# Patient Record
Sex: FEMALE | Race: WHITE | Employment: FULL TIME | URBAN - METROPOLITAN AREA
[De-identification: names, ages, dates, MRNs, and addresses within clinical notes are randomized per-mention and may not be internally consistent; named-entity substitution may affect disease eponyms.]

---

## 2017-03-27 ENCOUNTER — EMPLOYEE WELLNESS (OUTPATIENT)
Dept: OTHER | Age: 32
End: 2017-03-27

## 2017-05-16 ENCOUNTER — OFFICE VISIT (OUTPATIENT)
Dept: FAMILY MEDICINE CLINIC | Age: 32
End: 2017-05-16

## 2017-05-16 VITALS
WEIGHT: 239 LBS | BODY MASS INDEX: 38.41 KG/M2 | HEART RATE: 90 BPM | OXYGEN SATURATION: 98 % | DIASTOLIC BLOOD PRESSURE: 70 MMHG | HEIGHT: 66 IN | SYSTOLIC BLOOD PRESSURE: 114 MMHG

## 2017-05-16 DIAGNOSIS — Z3A.01 LESS THAN 8 WEEKS GESTATION OF PREGNANCY: ICD-10-CM

## 2017-05-16 DIAGNOSIS — R11.0 NAUSEA: Primary | ICD-10-CM

## 2017-05-16 DIAGNOSIS — G43.809 OTHER MIGRAINE WITHOUT STATUS MIGRAINOSUS, NOT INTRACTABLE: ICD-10-CM

## 2017-05-16 PROCEDURE — 99213 OFFICE O/P EST LOW 20 MIN: CPT | Performed by: FAMILY MEDICINE

## 2017-05-16 RX ORDER — PROMETHAZINE HYDROCHLORIDE 25 MG/1
25 TABLET ORAL EVERY 6 HOURS PRN
Qty: 30 TABLET | Refills: 0 | Status: SHIPPED | OUTPATIENT
Start: 2017-05-16 | End: 2017-07-27 | Stop reason: SDUPTHER

## 2017-05-16 ASSESSMENT — PATIENT HEALTH QUESTIONNAIRE - PHQ9
SUM OF ALL RESPONSES TO PHQ QUESTIONS 1-9: 0
1. LITTLE INTEREST OR PLEASURE IN DOING THINGS: 0
2. FEELING DOWN, DEPRESSED OR HOPELESS: 0
SUM OF ALL RESPONSES TO PHQ9 QUESTIONS 1 & 2: 0

## 2017-07-27 DIAGNOSIS — R11.0 NAUSEA: ICD-10-CM

## 2017-07-27 RX ORDER — PROMETHAZINE HYDROCHLORIDE 25 MG/1
25 TABLET ORAL EVERY 6 HOURS PRN
Qty: 30 TABLET | Refills: 0 | Status: SHIPPED | OUTPATIENT
Start: 2017-07-27

## 2017-07-31 RX ORDER — NORGESTIMATE AND ETHINYL ESTRADIOL 0.25-0.035
1 KIT ORAL DAILY
Qty: 1 PACKET | Refills: 3 | Status: SHIPPED | OUTPATIENT
Start: 2017-07-31

## 2018-03-20 VITALS — WEIGHT: 241 LBS | BODY MASS INDEX: 39.49 KG/M2

## 2023-09-08 ENCOUNTER — OFFICE VISIT (OUTPATIENT)
Dept: BARIATRICS/WEIGHT MGMT | Age: 38
End: 2023-09-08

## 2023-09-08 VITALS
HEIGHT: 67 IN | SYSTOLIC BLOOD PRESSURE: 120 MMHG | TEMPERATURE: 97.5 F | HEART RATE: 64 BPM | WEIGHT: 232.6 LBS | BODY MASS INDEX: 36.51 KG/M2 | DIASTOLIC BLOOD PRESSURE: 78 MMHG

## 2023-09-08 VITALS — BODY MASS INDEX: 36.51 KG/M2 | HEIGHT: 67 IN | WEIGHT: 232.6 LBS

## 2023-09-08 DIAGNOSIS — I10 ESSENTIAL HYPERTENSION: ICD-10-CM

## 2023-09-08 DIAGNOSIS — E66.01 MORBID OBESITY WITH BMI OF 40.0-44.9, ADULT (HCC): ICD-10-CM

## 2023-09-08 DIAGNOSIS — F41.9 ANXIETY: Primary | ICD-10-CM

## 2023-09-08 DIAGNOSIS — G43.909 MIGRAINE WITHOUT STATUS MIGRAINOSUS, NOT INTRACTABLE, UNSPECIFIED MIGRAINE TYPE: ICD-10-CM

## 2023-09-08 RX ORDER — METOPROLOL SUCCINATE 25 MG/1
25 TABLET, EXTENDED RELEASE ORAL DAILY
COMMUNITY
Start: 2023-08-20

## 2023-09-08 RX ORDER — PHENTERMINE HYDROCHLORIDE 37.5 MG/1
37.5 CAPSULE ORAL EVERY MORNING
Qty: 30 CAPSULE | Refills: 0 | Status: SHIPPED | OUTPATIENT
Start: 2023-09-08 | End: 2023-10-08

## 2023-09-08 RX ORDER — BUPROPION HYDROCHLORIDE 150 MG/1
150 TABLET ORAL DAILY
COMMUNITY
Start: 2023-08-19

## 2023-09-08 RX ORDER — ELETRIPTAN HYDROBROMIDE 40 MG/1
40 TABLET, FILM COATED ORAL
COMMUNITY

## 2023-09-08 RX ORDER — FREMANEZUMAB-VFRM 225 MG/1.5ML
INJECTION SUBCUTANEOUS
COMMUNITY
Start: 2023-08-03

## 2023-09-08 RX ORDER — TOPIRAMATE 25 MG/1
25 TABLET ORAL DAILY
COMMUNITY
Start: 2023-08-20

## 2023-09-08 RX ORDER — ALPRAZOLAM 0.5 MG/1
TABLET ORAL
COMMUNITY
Start: 2023-08-19

## 2023-09-08 RX ORDER — NORETHINDRONE ACETATE AND ETHINYL ESTRADIOL 1; .02 MG/1; MG/1
1 TABLET ORAL DAILY
COMMUNITY
Start: 2023-08-14

## 2023-09-08 NOTE — PATIENT INSTRUCTIONS
Goals:  Nutrition Goal:   Consider trying medium sized coffee instead of large iced coffee  Water Goal:  Continue at least four bottles of water a day or greater  Exercise Goal:  Take dog for a walk three days a week for 30 minutes on days not working.   I will use food journal to record meal times, serving sizes to keep self mindful and self awareness

## 2023-09-10 ASSESSMENT — ENCOUNTER SYMPTOMS
CONSTIPATION: 0
WHEEZING: 0
ABDOMINAL DISTENTION: 0
BLOOD IN STOOL: 0
EYE PAIN: 0
EYE DISCHARGE: 0
EYE ITCHING: 0
SHORTNESS OF BREATH: 0
COUGH: 0
EYE REDNESS: 0
VOMITING: 0
STRIDOR: 0
ANAL BLEEDING: 0
RHINORRHEA: 0
VOICE CHANGE: 0
CHOKING: 0
SINUS PRESSURE: 0
APNEA: 0
CHEST TIGHTNESS: 0
TROUBLE SWALLOWING: 0
NAUSEA: 0
SORE THROAT: 0
FACIAL SWELLING: 0
ALLERGIC/IMMUNOLOGIC NEGATIVE: 1
RECTAL PAIN: 0
PHOTOPHOBIA: 0
DIARRHEA: 0
COLOR CHANGE: 0
BACK PAIN: 0
ABDOMINAL PAIN: 0

## 2023-10-06 ENCOUNTER — OFFICE VISIT (OUTPATIENT)
Dept: BARIATRICS/WEIGHT MGMT | Age: 38
End: 2023-10-06

## 2023-10-06 VITALS
DIASTOLIC BLOOD PRESSURE: 78 MMHG | HEART RATE: 76 BPM | HEIGHT: 67 IN | SYSTOLIC BLOOD PRESSURE: 118 MMHG | BODY MASS INDEX: 35.28 KG/M2 | WEIGHT: 224.8 LBS | TEMPERATURE: 97.7 F

## 2023-10-06 RX ORDER — PHENTERMINE HYDROCHLORIDE 37.5 MG/1
37.5 CAPSULE ORAL EVERY MORNING
Qty: 30 CAPSULE | Refills: 0 | Status: SHIPPED | OUTPATIENT
Start: 2023-10-06 | End: 2023-11-05

## 2023-10-06 RX ORDER — SUMATRIPTAN 50 MG/1
TABLET, FILM COATED ORAL
COMMUNITY
Start: 2023-10-03

## 2023-10-06 ASSESSMENT — ENCOUNTER SYMPTOMS
EYE REDNESS: 0
SHORTNESS OF BREATH: 0
SINUS PRESSURE: 0
EYE ITCHING: 0
SORE THROAT: 0
COLOR CHANGE: 0
CHEST TIGHTNESS: 0
NAUSEA: 0
VOICE CHANGE: 0
STRIDOR: 0
EYE DISCHARGE: 0
WHEEZING: 0
CHOKING: 0
VOMITING: 0
BACK PAIN: 0
FACIAL SWELLING: 0
RHINORRHEA: 0
ANAL BLEEDING: 0
ABDOMINAL PAIN: 0
DIARRHEA: 0
APNEA: 0
TROUBLE SWALLOWING: 0
CONSTIPATION: 0
ALLERGIC/IMMUNOLOGIC NEGATIVE: 1
COUGH: 0
ABDOMINAL DISTENTION: 0
BLOOD IN STOOL: 0
EYE PAIN: 0
PHOTOPHOBIA: 0
RECTAL PAIN: 0

## 2023-10-06 NOTE — PROGRESS NOTES
Ronaldneo Espitia (:  1985)     ASSESSMENT:  1.  Obesity (BMI 35)  2. Hypertension  3. Migraines    PLAN:  1. Brief discussion about the pros and cons of weight loss surgery. The risks benefits and alternatives to laparoscopic adjustable band, gastric sleeve and gastric Awais-en-Y bypass were discussed in detail. The pros and cons of robotic assisted, laparoscopic and open techniques were discussed. 2.  Behavior modification discussed again in regards to dietary habits. 3.  Nutritional education occurred during visit. Follow up with dietitian for further evaluation. Continue to follow recommendations as directed  4. Options for medical management of morbid obesity again discussed. 5.  Improvement in fitness/exercise discussed again with patient and the need for this with/without surgery. 6.  Obtain medical necessity letter from PCP as needed. 7.  Follow-up in one month at weight management program at [facility]. 8.  Signs and symptoms reviewed with patient that would be concerning and need her to return to office for re-evaluation. Patient states she will call if she has questions or concerns. 9. Multivitamin  10. Psychology evaluation as needed  11. EGD prior to any surgical intervention  12. Encouraged support groups  13. Discussed the pros and cons along with possible side effects/complications of weight loss medications again. All questions answered. Patient wishing to continue with Adipex. Second month prescription given. More than 36 minutes spent with patient today. Greater than 50% of the time was involved counseling, educaton and coordinating care. SUBJECTIVE/OBJECTIVE:    Chief Complaint   Patient presents with    Weight Loss     1 month follow up adipex     JULIEN  Indy Elise is a 82-year-old female present for follow-up at the weight management program secondary to her obesity. BMI 35. Current weight 224 pounds. She has lost a pound since last visit.

## 2023-11-03 ENCOUNTER — OFFICE VISIT (OUTPATIENT)
Dept: BARIATRICS/WEIGHT MGMT | Age: 38
End: 2023-11-03

## 2023-11-03 VITALS
WEIGHT: 224 LBS | SYSTOLIC BLOOD PRESSURE: 126 MMHG | HEIGHT: 67 IN | DIASTOLIC BLOOD PRESSURE: 72 MMHG | TEMPERATURE: 96.8 F | HEART RATE: 78 BPM | BODY MASS INDEX: 35.16 KG/M2

## 2023-11-03 RX ORDER — PHENTERMINE HYDROCHLORIDE 37.5 MG/1
37.5 TABLET ORAL
Qty: 30 TABLET | Refills: 0 | Status: SHIPPED | OUTPATIENT
Start: 2023-11-03 | End: 2023-12-03

## 2023-11-03 ASSESSMENT — ENCOUNTER SYMPTOMS
SINUS PRESSURE: 0
STRIDOR: 0
EYE REDNESS: 0
COLOR CHANGE: 0
PHOTOPHOBIA: 0
COUGH: 0
ALLERGIC/IMMUNOLOGIC NEGATIVE: 1
WHEEZING: 0
ABDOMINAL DISTENTION: 0
EYE PAIN: 0
RECTAL PAIN: 0
EYE ITCHING: 0
VOICE CHANGE: 0
EYE DISCHARGE: 0
SORE THROAT: 0
APNEA: 0
RHINORRHEA: 0
ANAL BLEEDING: 0
BACK PAIN: 0
CHOKING: 0
CHEST TIGHTNESS: 0
BLOOD IN STOOL: 0
TROUBLE SWALLOWING: 0
VOMITING: 0
SHORTNESS OF BREATH: 0
DIARRHEA: 0
ABDOMINAL PAIN: 0
NAUSEA: 0
CONSTIPATION: 0
FACIAL SWELLING: 0

## 2023-11-03 NOTE — PROGRESS NOTES
tablet by mouth once as needed may repeat in 2 hours if necessary      aspirin-acetaminophen-caffeine (EXCEDRIN MIGRAINE) 250-250-65 MG per tablet Take 1 tablet by mouth every 6 hours as needed      norgestimate-ethinyl estradiol (ORTHO-CYCLEN, 28,) 0.25-35 MG-MCG per tablet Take 1 tablet by mouth daily 1 packet 3    metoprolol succinate (TOPROL XL) 25 MG extended release tablet Take 1 tablet by mouth daily       No current facility-administered medications for this visit. No Known Allergies    Family History   Problem Relation Age of Onset    Hypertension Mother     Arthritis Mother     Diabetes Mother     High Blood Pressure Mother     Arthritis Maternal Grandmother     Hypertension Maternal Grandmother     High Blood Pressure Maternal Grandmother     Hypertension Maternal Grandfather     Diabetes Maternal Grandfather     High Blood Pressure Maternal Grandfather     Hypertension Paternal Grandmother     High Blood Pressure Paternal Grandmother        Social History     Socioeconomic History    Marital status:      Spouse name: Not on file    Number of children: Not on file    Years of education: Not on file    Highest education level: Not on file   Occupational History    Not on file   Tobacco Use    Smoking status: Never    Smokeless tobacco: Not on file   Substance and Sexual Activity    Alcohol use: Yes     Comment: occasional    Drug use: No    Sexual activity: Yes     Partners: Male     Birth control/protection: I.U.D.    Other Topics Concern    Not on file   Social History Narrative    Not on file     Social Determinants of Health     Financial Resource Strain: Not on file   Food Insecurity: Not on file   Transportation Needs: Not on file   Physical Activity: Not on file   Stress: Not on file   Social Connections: Not on file   Intimate Partner Violence: Not on file   Housing Stability: Not on file     Vitals:    11/03/23 0802   BP: 126/72   Site: Right Upper Arm   Position: Sitting   Cuff Size:

## 2024-04-12 ENCOUNTER — OFFICE VISIT (OUTPATIENT)
Dept: BARIATRICS/WEIGHT MGMT | Age: 39
End: 2024-04-12

## 2024-04-12 VITALS
DIASTOLIC BLOOD PRESSURE: 80 MMHG | BODY MASS INDEX: 36.32 KG/M2 | SYSTOLIC BLOOD PRESSURE: 130 MMHG | WEIGHT: 231.4 LBS | HEART RATE: 68 BPM | HEIGHT: 67 IN | RESPIRATION RATE: 12 BRPM | TEMPERATURE: 99 F

## 2024-04-12 DIAGNOSIS — G43.909 MIGRAINE WITHOUT STATUS MIGRAINOSUS, NOT INTRACTABLE, UNSPECIFIED MIGRAINE TYPE: ICD-10-CM

## 2024-04-12 DIAGNOSIS — I10 ESSENTIAL HYPERTENSION: ICD-10-CM

## 2024-04-13 ASSESSMENT — ENCOUNTER SYMPTOMS
FACIAL SWELLING: 0
TROUBLE SWALLOWING: 0
VOICE CHANGE: 0
BLOOD IN STOOL: 0
COLOR CHANGE: 0
CHOKING: 0
RHINORRHEA: 0
BACK PAIN: 0
EYE PAIN: 0
CHEST TIGHTNESS: 0
SHORTNESS OF BREATH: 0
VOMITING: 0
WHEEZING: 0
ALLERGIC/IMMUNOLOGIC NEGATIVE: 1
RECTAL PAIN: 0
ABDOMINAL PAIN: 0
APNEA: 0
ABDOMINAL DISTENTION: 0
NAUSEA: 0
EYE ITCHING: 0
SORE THROAT: 0
SINUS PRESSURE: 0
PHOTOPHOBIA: 0
EYE DISCHARGE: 0
ANAL BLEEDING: 0
EYE REDNESS: 0
CONSTIPATION: 0
DIARRHEA: 0
STRIDOR: 0
COUGH: 0

## 2024-04-13 NOTE — PROGRESS NOTES
Roxanne Mendez (:  1985)     ASSESSMENT:  1.  Obesity (BMI 36)  2.  Hypertension  3.  Migraines    PLAN:  1. Discussion again about the pros and cons of weight loss surgery.  The risks benefits and alternatives to laparoscopic adjustable band, gastric sleeve and gastric Awais-en-Y bypass were discussed in detail.  The pros and cons of robotic assisted, laparoscopic and open techniques were discussed.  2.  Behavior modification discussed again in regards to dietary habits.  3.  Nutritional education occurred during visit.  Follow up with dietitian for further evaluation.  Continue to follow recommendations as directed  4.  Options for medical management of morbid obesity again discussed.  5.  Improvement in fitness/exercise discussed again with patient and the need for this with/without surgery.  6.  Obtain medical necessity letter from PCP as needed.  7.  Follow-up in one month at weight management program at [place].  8.  Signs and symptoms reviewed with patient that would be concerning and need her to return to office for re-evaluation. Patient states she will call if she has questions or concerns.   9. Multivitamin  10.  Psychology evaluation   11.  EGD prior to any surgical intervention  12.  Encouraged support groups  13.   Discussed the pros and cons along with possible side effects/complications of weight loss medications again.  All questions answered.  She is going to hold on further weight loss medications at this time as she is wishing to proceed with surgical intervention for failure of losing significant weight with medical management attempts..     42 minutes spent with patient today.  Greater than 50% of the time was involved counseling, educaton and coordinating care.    SUBJECTIVE/OBJECTIVE:    Chief Complaint   Patient presents with    Weight Management     Ov TO RESUME ADIPEX     HPI  Roxanne is a 38-year-old female who presents for further evaluation at the weight

## 2024-08-12 RX ORDER — PHENTERMINE HYDROCHLORIDE 37.5 MG/1
37.5 CAPSULE ORAL EVERY MORNING
Qty: 30 CAPSULE | OUTPATIENT
Start: 2024-08-12

## 2025-06-08 ENCOUNTER — APPOINTMENT (OUTPATIENT)
Dept: GENERAL RADIOLOGY | Age: 40
End: 2025-06-08
Payer: COMMERCIAL

## 2025-06-08 ENCOUNTER — HOSPITAL ENCOUNTER (EMERGENCY)
Age: 40
Discharge: HOME | End: 2025-06-08
Payer: COMMERCIAL

## 2025-06-08 VITALS
TEMPERATURE: 97 F | OXYGEN SATURATION: 98 % | HEART RATE: 85 BPM | BODY MASS INDEX: 41.5 KG/M2 | SYSTOLIC BLOOD PRESSURE: 118 MMHG | HEIGHT: 66 IN | DIASTOLIC BLOOD PRESSURE: 87 MMHG | WEIGHT: 258.2 LBS | RESPIRATION RATE: 16 BRPM

## 2025-06-08 DIAGNOSIS — S93.401A SPRAIN OF RIGHT ANKLE, UNSPECIFIED LIGAMENT, INITIAL ENCOUNTER: Primary | ICD-10-CM

## 2025-06-08 PROCEDURE — 99213 OFFICE O/P EST LOW 20 MIN: CPT

## 2025-06-08 PROCEDURE — 99203 OFFICE O/P NEW LOW 30 MIN: CPT

## 2025-06-08 PROCEDURE — 73610 X-RAY EXAM OF ANKLE: CPT

## 2025-06-08 ASSESSMENT — PAIN - FUNCTIONAL ASSESSMENT: PAIN_FUNCTIONAL_ASSESSMENT: NONE - DENIES PAIN

## 2025-06-08 NOTE — ED TRIAGE NOTES
Arrives to [facility] for the evaluation of intermittent right ankle pain since 4/20/25 after \"rolling\" the ankle.  Has lateral ankle swelling and denies pain at this time.  Sometimes while walking the pain will shoot up to 10/10.  Reports numbness in the toes.  VSS.      Xray completed on arrival- Results pending.

## 2025-06-08 NOTE — ED PROVIDER NOTES
[facility]         [facility] COURSE:     Vitals:    06/08/25 1703   BP: 118/87   Pulse: 85   Resp: 16   Temp: 97 °F (36.1 °C)   TempSrc: Temporal   SpO2: 98%   Weight: 117.1 kg (258 lb 3.2 oz)   Height: 1.676 m (5' 6\")       Medications - No data to display  PROCEDURES:  None  FINAL IMPRESSION      1. Sprain of right ankle, unspecified ligament, initial encounter        DISPOSITION/PLAN   DISPOSITION Decision To Discharge 06/08/2025 05:37:06 PM   DISPOSITION CONDITION Stable         X-ray reveals no acute fracture as read by myself.  Discussed with patient she may follow-up with orthopedics for further evaluation and management if symptoms do not improve.  Discussed with patient sprains can last multiple weeks.  Discussed with her she may use an Ace wrap for extra compression or comfort.  Tylenol and ibuprofen may be continued.  Patient in agreement with this plan.    PATIENT REFERRED TO:  [facility]  [address] [place] [address]  [phone]  Schedule an appointment as soon as possible for a visit   As needed    DISCHARGE MEDICATIONS:  New Prescriptions    No medications on file     Current Discharge Medication List          MARINO Schreiber CNP, Alecksa N, APRN - CNP  06/08/25 1384